# Patient Record
Sex: FEMALE
[De-identification: names, ages, dates, MRNs, and addresses within clinical notes are randomized per-mention and may not be internally consistent; named-entity substitution may affect disease eponyms.]

---

## 2022-08-01 PROBLEM — Z00.00 ENCOUNTER FOR PREVENTIVE HEALTH EXAMINATION: Status: ACTIVE | Noted: 2022-08-01

## 2022-08-03 ENCOUNTER — APPOINTMENT (OUTPATIENT)
Dept: ORTHOPEDIC SURGERY | Facility: CLINIC | Age: 62
End: 2022-08-03

## 2022-08-03 VITALS — WEIGHT: 180 LBS | BODY MASS INDEX: 30.73 KG/M2 | HEIGHT: 64 IN

## 2022-08-03 DIAGNOSIS — S92.515A NONDISPLACED FRACTURE OF PROXIMAL PHALANX OF LEFT LESSER TOE(S), INITIAL ENCOUNTER FOR CLOSED FRACTURE: ICD-10-CM

## 2022-08-03 DIAGNOSIS — Z78.9 OTHER SPECIFIED HEALTH STATUS: ICD-10-CM

## 2022-08-03 PROCEDURE — 73630 X-RAY EXAM OF FOOT: CPT | Mod: LT

## 2022-08-03 PROCEDURE — 99203 OFFICE O/P NEW LOW 30 MIN: CPT

## 2022-08-03 NOTE — PHYSICAL EXAM
Telephone call to [address] [place] [address] Left voicemail message of nature of call with request for return phone call. Call back number was provided. [5th] : 5th [NL (40)] : plantar flexion 40 degrees [NL 30)] : inversion 30 degrees [NL (20)] : eversion 20 degrees [5___] : [facility] 5[unfilled]/5 [2+] : posterior tibialis pulse: 2+ [Normal] : saphenous nerve sensation normal [] : mildly antalgic [Left] : left foot [Weight -] : weightbearing [FreeTextEntry3] : Swelling/ecchymosis 5th toe.  [de-identified] : Non-displaced fracture 5th proximal phalanx, pes planus

## 2022-08-03 NOTE — HISTORY OF PRESENT ILLNESS
[6] : 6 [2] : 2 [Radiating] : radiating [Throbbing] : throbbing [Constant] : constant [Meds] : meds [Ice] : ice [Standing] : standing [Walking] : walking [Exercising] : exercising [de-identified] : Pt. is a 62 year old female who presents for evaluation of her LT 5th toe. Reports accidently stubbing it on 7/29/22. WB in sandals, vicky taping. Ice to affected area. No previous injury/problem with L foot. No formal treatment to date.  [] : Post Surgical Visit: no [FreeTextEntry1] : Left Foot/Lesser Toe [FreeTextEntry3] : 7/29/22 [FreeTextEntry5] : Patient hit her foot into a stone [FreeTextEntry7] : up the the left knee [FreeTextEntry9] : tape on foot

## 2022-08-03 NOTE — ASSESSMENT
[FreeTextEntry1] : WBAT in hard sole shoe.\par Ice to affected area.\par NSAIDS prn.\par Neto taping 4th/5th toes.\par \par Repeat x-ray will be performed at the next office visit.\par

## 2022-08-31 ENCOUNTER — APPOINTMENT (OUTPATIENT)
Dept: ORTHOPEDIC SURGERY | Facility: CLINIC | Age: 62
End: 2022-08-31

## 2022-08-31 DIAGNOSIS — S92.516D: ICD-10-CM

## 2022-08-31 PROCEDURE — 99213 OFFICE O/P EST LOW 20 MIN: CPT

## 2022-08-31 PROCEDURE — 73660 X-RAY EXAM OF TOE(S): CPT | Mod: LT

## 2022-08-31 NOTE — HISTORY OF PRESENT ILLNESS
[6] : 6 [2] : 2 [Radiating] : radiating [Throbbing] : throbbing [Constant] : constant [Meds] : meds [Ice] : ice [Standing] : standing [Walking] : walking [Exercising] : exercising [de-identified] : Patient returns for her left  LT 5th toe proximal phalanx fracture from 7/29/22.  She is no longer vicky taping.  She has no pain at this time. [] : Post Surgical Visit: no [FreeTextEntry1] : Left Foot/Lesser Toe [FreeTextEntry3] : 7/29/22 [FreeTextEntry5] : Patient hit her foot into a stone [FreeTextEntry7] : up the the left knee [FreeTextEntry9] : tape on foot

## 2022-08-31 NOTE — PHYSICAL EXAM
[NL (40)] : plantar flexion 40 degrees [NL 30)] : inversion 30 degrees [NL (20)] : eversion 20 degrees [5___] : eversion 5[unfilled]/5 [2+] : posterior tibialis pulse: 2+ [Normal] : saphenous nerve sensation normal [] : non-antalgic [Left] : left toe [Toe #: ____] : toe # [unfilled] [The fracture is in acceptable alignment. There is progression in healing seen] : The fracture is in acceptable alignment. There is progression in healing seen [de-identified] : Non-displaced fracture 5th proximal phalanx with bridging callus and faint fracture line.